# Patient Record
Sex: FEMALE | Race: WHITE
[De-identification: names, ages, dates, MRNs, and addresses within clinical notes are randomized per-mention and may not be internally consistent; named-entity substitution may affect disease eponyms.]

---

## 2019-08-19 ENCOUNTER — HOSPITAL ENCOUNTER (OUTPATIENT)
Dept: HOSPITAL 62 - SC | Age: 49
Discharge: HOME | End: 2019-08-19
Attending: OTOLARYNGOLOGY
Payer: MEDICARE

## 2019-08-19 DIAGNOSIS — I10: ICD-10-CM

## 2019-08-19 DIAGNOSIS — J34.3: ICD-10-CM

## 2019-08-19 DIAGNOSIS — J30.9: ICD-10-CM

## 2019-08-19 DIAGNOSIS — H61.21: ICD-10-CM

## 2019-08-19 DIAGNOSIS — Z98.890: ICD-10-CM

## 2019-08-19 DIAGNOSIS — H92.01: ICD-10-CM

## 2019-08-19 DIAGNOSIS — J34.89: Primary | ICD-10-CM

## 2019-08-19 DIAGNOSIS — H91.90: ICD-10-CM

## 2019-08-19 DIAGNOSIS — H72.91: ICD-10-CM

## 2019-08-19 DIAGNOSIS — E04.1: ICD-10-CM

## 2019-08-19 DIAGNOSIS — H71.91: ICD-10-CM

## 2019-08-19 DIAGNOSIS — J32.9: ICD-10-CM

## 2019-08-19 DIAGNOSIS — J34.2: ICD-10-CM

## 2019-08-19 DIAGNOSIS — T81.30XA: ICD-10-CM

## 2019-08-19 PROCEDURE — 00160 ANES PX NOSE&SINUS NOS: CPT

## 2019-08-19 PROCEDURE — 36415 COLL VENOUS BLD VENIPUNCTURE: CPT

## 2019-08-19 PROCEDURE — 87070 CULTURE OTHR SPECIMN AEROBIC: CPT

## 2019-08-19 PROCEDURE — 82785 ASSAY OF IGE: CPT

## 2019-08-19 PROCEDURE — 87077 CULTURE AEROBIC IDENTIFY: CPT

## 2019-08-19 PROCEDURE — 69205 CLEAR OUTER EAR CANAL: CPT

## 2019-08-19 PROCEDURE — 87186 SC STD MICRODIL/AGAR DIL: CPT

## 2019-08-19 PROCEDURE — 87205 SMEAR GRAM STAIN: CPT

## 2019-08-19 PROCEDURE — 31299 UNLISTED PX ACCESSORY SINUS: CPT

## 2019-08-19 PROCEDURE — 87075 CULTR BACTERIA EXCEPT BLOOD: CPT

## 2019-08-19 PROCEDURE — 86003 ALLG SPEC IGE CRUDE XTRC EA: CPT

## 2019-08-20 NOTE — [facility] OPERATIVE REPORT E
[facility] Oper[facility]



NAME: HOANG MATTHEWS

                                      MRN: [id number]

                                      AGE: 48Y

DATE OF SURGERY: 08/19/2019          ROOM:



PREOPERATIVE DIAGNOSES:

1.  Severe nasal pain.

2.  Extruding intranasal suture from a previous nasal surgery.

3.  Firm/hard intranasal debris encasing aspects of the extruded suture.

4.  Right otorrhea.

5.  Right otalgia.

6.  Right tympanic membrane perforation.

7.  History of right ear surgery.



POSTOPERATIVE DIAGNOSES:

1.  Severe nasal pain.

2.  Extruding intranasal suture from a previous nasal surgery.

3.  Firm/hard intranasal debris encasing aspects of the extruded suture.

4.  Right otorrhea.

5.  Right otalgia.

6.  Right tympanic membrane perforation.

7.  History of right ear surgery.



OPERATION PERFORMED:

1.  Exam under anesthesia of the nose with removal of nasoliths and

extruded black nylon suture from the nasal septum.

2.  Exam under anesthesia of the nose with debridement and rule out of an

intranasal abscess process.

3.  Exam under anesthesia of the right ear under microscopy under general

anesthesia with aural debridement.

4.  Right ear application of Gold Dust medicated powder under microscopy.

5.  Bilateral transnasal rigid surgical endoscopy.



SURGEON:

MAX RAMOS D.O.



ANESTHESIA:

General endotracheal tube.



ANESTHESIA STAFF:

BRENTON Huitron



ESTIMATED BLOOD LOSS:

Less than 1 mL.



FLUIDS:

500 mL.



COMPLICATIONS:

None.



DRAINS:

None.



SPONGE COUNT:

Verified.



MATERIALS FORWARDED AS SPECIMEN:

Intranasal extruded black nylon suture with a right greater than left nasal

passage nasolith.



FINDINGS:

1.  Intranasal evaluation with an extruded black nylon suture standing on

both sides of the nasal septum.  In each nasal passage there was a

nasolith/hard concretion around the extruded suture on the right side

greater than the left side.  There was no purulence noted on inspection or

upon probing of the nasal septum.  There was nasal septal deviation

involving bone and cartilage present.  There were internal and external

nasal deformities and turbinate hypertrophy noted.

2.  The right ear canal and TM was with cerumen impaction and mucoid

material especially surrounding the chronic-appearing right tympanic

membrane perforation that was approximately 25% in size.  The cerumen was

extending into the middle ear space.  There was granulation tissue

surrounding the margin of the perforation site which was noted after the

cerumen was removed.  Upon completion of the aural debridement, Gold Dust

medicated powder was applied.



INDICATIONS:

This is a 48-year-old white female patient who was seen and evaluated in

the [facility] office.  The patient had been referred for, and

she complained of severe nasal pain with complaint of an extruding

intranasal suture from a previous nasal surgery performed over 20 years

ago.  The patient has noted worsening nasal pain over the past 6 to 12

months.  The patient also complains of right ear pain, recurrent otorrhea,

and a right tympanic membrane perforation over the past 4 years.  She

reports prior history of a right tympanoplasty to repair a previous right

eardrum perforation.  [place] of [place].  The patient underwent flexible

endoscopy which had to be stopped due to patient discomfort.  Findings were

as noted above.  Discussion was also held to perform right ear aural

debridement under microscopy in the clinic setting, but the patient

declined due to concern for pain.  After extensive discussion with the

patient, recommendations and plan was made to proceed with exam under

anesthesia in the operating room setting of the nose and the ear. 

Evaluation of the nose was to include nasal debridement, suture removal,

and rule out of an intranasal abscess process, and cultures were to be

taken.  The ear exam under anesthesia was to include aural debridement and

placement of medication as indicated.  The procedures and all of their

risks and complications were all discussed in detail with the patient.  She

voiced an understanding of the described surgical plan, agreed to proceed,

and consent was obtained.



PROCEDURE:

The patient was taken to the operating room and placed on the operating

room table in the supine position.  Appropriate monitors were placed. 

Using mask and IV access, general anesthesia was induced.  The patient was

transorally intubated without difficulty.  The patient next underwent a

nasal examination with injection of local anesthetic with epinephrine.  The

patient underwent bilateral transnasal rigid surgical endoscopy with

findings as noted above.  The suture was cut on the left side and was then

pulled all the way through with suture and concretions/nasolith being sent

for pathology evaluation.  Prior to any debridement, there were first

bilateral intranasal cultures that were obtained.  The nasal septum was

probed and there were no areas of fluctuance or purulence released.  The

areas that appeared as fullness were actually deviations of septal

cartilage and/or bone.  Once the process was complete, there was Bacitracin

ointment placed into each nasal passage.



At this point attention was turned to the patient's right ear which was

examined under microscopy with use of an ear speculum.  Cerumen was gently

elevated from the EAC and tympanic membrane surface as well as the portion

extending into the middle ear space, and it was all removed intact.  There

was no obvious cerumen/debris remaining in the middle ear space.  There was

mucoid material that was also suctioned.  Granulation tissue that was

present around the perforation site margin was with scant bleeding.  Once

the aural debridement was complete, there was a very limited dusting of

Gold Dust medicated powder placed into the right ear.  At this point the

operating room microscope was withdrawn.  The patient was then returned to

the anesthesia staff and was allowed to emerge from general anesthesia. 

The patient was extubated in the main operating room and was then

transported to the postanesthesia recovery unit in stable condition.  There

were no complications.



DICTATING PHYSICIAN: MAX RAMOS D.O.



[id number]              DT: 08/20/2019 2037

PHY#: [id number]         DD: 08/20/2019 1916

ID:   [id number]               JOB#: [id number]       ACCT: [id number]



cc:MAX RAMOS D.O.

>

## 2019-09-03 ENCOUNTER — HOSPITAL ENCOUNTER (OUTPATIENT)
Dept: HOSPITAL 62 - RAD | Age: 49
End: 2019-09-03
Attending: OTOLARYNGOLOGY
Payer: MEDICARE

## 2019-09-03 DIAGNOSIS — J32.9: Primary | ICD-10-CM

## 2019-09-03 DIAGNOSIS — E04.1: ICD-10-CM

## 2019-09-03 PROCEDURE — 76536 US EXAM OF HEAD AND NECK: CPT

## 2019-09-03 PROCEDURE — 70486 CT MAXILLOFACIAL W/O DYE: CPT

## 2019-09-03 NOTE — RADIOLOGY REPORT (SQ)
EXAM DESCRIPTION:  U/S THYROID/SFT TISS HD   NECK



COMPLETED DATE/TIME:  9/3/2019 4:02 pm



REASON FOR STUDY:  E04.1 NONTOXIC SINGLE THYROID NODULE J32.9  CHRONIC SINUSITIS, UNSPECIFIED E04.1  
NONTOXIC SINGLE THYROID NODULE



COMPARISON:  None.



TECHNIQUE:  Dynamic and static gray-scale images acquired of the thyroid gland. Selected additional c
olor/power Doppler images recorded.  All images stored to PACS.



LIMITATIONS:  None.



FINDINGS:  RIGHT LOBE: Normal size, 4.2 x 1.7 x 1.1 cm in size.  Homogeneous echotexture.  No solid m
asses.  4 mm well-circumscribed spongiform nodule, wider than tall, smooth margins, no echogenic foci
 (TI-RADS 1)

LEFT LOBE: Normal size, 4 x 1.6 x 1.3 cm in size.  Homogeneous echotexture.  No cystic or solid norma
s.

ISTHMUS: Normal thickness, 2 mm.  Homogeneous echotexture.  No cystic or solid masses.

OTHER: No other significant finding.



IMPRESSION:  ESSENTIALLY NORMAL THYROID ULTRASOUND.



COMMENT:   The American College of Radiology (ACR) Thyroid Imaging Reporting And Data System (TI-RADS
) is an ultrasound feature based summed scoring system of risk categorization and management recommen
dations for thyroid nodules.

TI-RADS assessment categories are as follows:

0 - Incomplete exam: Additional imaging or comparison to prior examinations recommended.

1. - Benign: Fine-needle aspiration or follow-up not routinely recommended in the absence of clinical
 change.

2. - Not suspicious: Fine-needle aspiration or follow-up not routinely recommended in the absence of 
clinical change.

3. - Mildly suspicious: Fine-needle aspiration recommended if greater than or equal to 2.5 cm in size
. Ultrasound follow-up recommended if greater than or equal to 1.5 cm in size.

4. - Moderately suspicious: Fine-needle aspiration recommended if greater than or equal to 1.5 cm in 
size. Ultrasound follow-up recommended if greater than or equal to 1.0 cm in size.

5. - Highly suspicious: Fine-needle aspiration recommended if greater than or equal to 1.0 cm in size
. Ultrasound follow-up recommended if greater than or equal to 0.5 cm in size.



TECHNICAL DOCUMENTATION:  JOB ID:  [id number]

 2011 [employer]- All Rights Reserved



Reading location - IP/workstation name: [email or web address]

## 2019-09-03 NOTE — RADIOLOGY REPORT (SQ)
EXAM DESCRIPTION:  CT SINUSES FOR ENT



COMPLETED DATE/TIME:  9/3/2019 4:32 pm



REASON FOR STUDY:  J32.9 CHRONIC SINUSITIS, UNSPECIFIED J32.9  CHRONIC SINUSITIS, UNSPECIFIED E04.1  
NONTOXIC SINGLE THYROID NODULE



COMPARISON:  None.



TECHNIQUE:  Noncontrast scanning through the paranasal sinuses using bone algorithm.   Reconstructed 
MPR images reviewed.  All images stored on PACS.

All CT scanners at this facility use dose modulation, iterative reconstruction, and/or weight based d
osing when appropriate to reduce radiation dose to as low as reasonably achievable (ALARA).

[facility]: Dose Right  [facility]: CareDose    [facility]: Dose Right    [facility]: Teradose 4D    [facility]: Smart Technologies



RADIATION DOSE:  46mGy.



LIMITATIONS:  None.



FINDINGS:  Right sinuses and drainage pathways:

Post-surgical changes: Right sided endoscopic sinus surgery with widening of the ostiomeatal unit, re
section of anterior ethmoid septa.  There is a radiopaque foreign body in the inferior right frontal 
sinus and right frontoethmoidal recess on sagittal image 136 and coronal image 94.

Frontal sinus:  Mucous membrane thickening along the floor of the right frontal sinus and fronto ethm
oid recess.

Frontoethmoidal Recess:  Opacified with mucous membrane thickening.

Anterior Ethmoid Sinuses:  Post resection of the ethmoid septa with persistent mucous membrane thicke
derrek along the lateral wall of the anterior ethmoids.

Posterior Ethmoid Sinuses:  Completely opacified

Sphenoid Sinus: Mucous membrane thickening anterior wall

Sphenoethmoidal Recess:  Completely opacified

Maxillary Sinus:  Circumferential mucous membrane thickening

Ostiomeatal Complex:  Surgical widening on coronal images , with persistent mucous membrane thi
ckening occluding outlet.

Left Sinuses and Drainage Pathways:

Post-Surgical Changes:  Surgical widening of the left maxillary sinus outlet and resection of anterio
r left ethmoid septa

Frontal Sinus:  Normal.

Frontoethmoidal Recess:  Mucous membrane thickening along the fronto ethmoid recess

Anterior Ethmoid Sinuses:  Persistent mucous membrane thickening along the medial aspect of the commo
n anterior ethmoid cavity

Posterior Ethmoid Sinuses:  Normal.

Sphenoid Sinus:  Normal.

Sphenoethmoidal Recess:  Patent on axial image 76

Maxillary Sinus:  Normal.

Ostiomeatal Complex:  Surgical widening of the left ostiomeatal complex.  No significant mucous membr
ane thickening

Right Olfactory Fossa:  Normal.

Left Olfactory Fossa:  Normal.

Middle Turbinate Jhoana Bullosa: No

Paradoxical Middle Turbinate:  No

Atelectatic Uncinated Process:  No

Frontal Fausto Cell Type I:  Bilateral.

Frontal Fausto Cell Type II:  On the right on sagittal image 138 and coronal image 77

Interfrontal Sinus Septal Cell:  None.

Suptra-Orbital Ethmoid:  Bilateral.

Frontal Bullar Cell:  None.

Suprabullar Bullar Cell:  None.

Sphenoethmoidal (Onodi) Cell:  None.

Pneumatization of the Anterior Clinoid Processes:  None.

Hypoplastic Maxillary Sinus:  None.

Osteoneogenesis:  None.

Bone Dehiscence: None.

Nasal Cavity:  Normal.

Nasal Septum:  Normal.

Anatomic Variants:

Right Vidian Canal:  Normal

Left Vidian Canal:  Normal



IMPRESSION:  Chronic appearing inflammatory changes as above.  Postsurgical changes as above



TECHNICAL DOCUMENTATION:  JOB ID:  [id number]

Quality ID # 436: Final reports with documentation of one or more dose reduction techniques (e.g., Au
tomated exposure control, adjustment of the mA and/or kV according to patient size, use of iterative 
reconstruction technique)

 2011 [employer]- All Rights Reserved



Reading location - IP/workstation name: [email or web address]

## 2019-11-18 ENCOUNTER — HOSPITAL ENCOUNTER (OUTPATIENT)
Dept: HOSPITAL 62 - LAB | Age: 49
End: 2019-11-18
Attending: INTERNAL MEDICINE
Payer: MEDICARE

## 2019-11-18 ENCOUNTER — HOSPITAL ENCOUNTER (OUTPATIENT)
Dept: HOSPITAL 62 - WI | Age: 49
End: 2019-11-18
Attending: INTERNAL MEDICINE
Payer: MEDICARE

## 2019-11-18 DIAGNOSIS — R10.13: Primary | ICD-10-CM

## 2019-11-18 LAB
ALBUMIN SERPL-MCNC: 4.4 G/DL (ref 3.5–5)
ALP SERPL-CCNC: 138 U/L (ref 38–126)
AST SERPL-CCNC: 26 U/L (ref 14–36)
BILIRUB DIRECT SERPL-MCNC: 0.1 MG/DL (ref 0–0.4)
BILIRUB SERPL-MCNC: 0.6 MG/DL (ref 0.2–1.3)
PROT SERPL-MCNC: 7.5 G/DL (ref 6.3–8.2)

## 2019-11-18 PROCEDURE — 76705 ECHO EXAM OF ABDOMEN: CPT

## 2019-11-18 PROCEDURE — 36415 COLL VENOUS BLD VENIPUNCTURE: CPT

## 2019-11-18 PROCEDURE — 83690 ASSAY OF LIPASE: CPT

## 2019-11-18 PROCEDURE — 80076 HEPATIC FUNCTION PANEL: CPT

## 2019-11-18 NOTE — [facility]OMENS IMAGING REPORT
EXAM DESCRIPTION:  U/S ABDOMEN LIMITED



COMPLETED DATE/TIME:  11/18/2019 9:15 am



REASON FOR STUDY:  R10.13 EPIGASTRIC PAIN R10.13  EPIGASTRIC PAIN



COMPARISON:  None.



TECHNIQUE:  Dynamic and static grayscale images acquired of the abdomen and recorded on PACS. Additio
nal selected color Doppler and spectral images recorded.



LIMITATIONS:  None.



FINDINGS:  PANCREAS:  The head of the pancreas is of normal echogenicity.  The body and tail are obsc
ured by overlying bowel gas.

LIVER:  Mildly fatty liver.  The liver measures 15.0 cm in length, normal size.

LIVER VASCULATURE: Normal directional flow of the main portal vein and hepatic veins.

GALLBLADDER:  Prior cholecystectomy.

ULTRASOUND-DETECTED LAU'S SIGN: Negative.

INTRAHEPATIC DUCTS AND COMMON DUCT: CBD measures 4.8 mm in diameter, normal.  The  intrahepatic ducts
 normal caliber. No filling defects.

INFERIOR VENA CAVA:  Suboptimal visualization due to overlying bowel gas.

AORTA:  The proximal segment is patent.  The mid and distal segments are obscured by overlying bowel 
gas.

RIGHT KIDNEY:  The right kidney measures 11.0 x 4.3 x 4.8 cm, normal size. Normal echogenicity. No so
lid or suspicious masses. No hydronephrosis. No calcifications.

PERITONEAL AND RIGHT PLEURAL SPACE: No ascites or effusions.

OTHER: No other significant findings.



IMPRESSION:  1.  Prior cholecystectomy.

2.  Suboptimal visualization of the abdominal aorta, inferior vena cava and body and tail of the panc
reas due to overlying bowel gas.

3.  Mild fatty liver.



TECHNICAL DOCUMENTATION:  JOB ID:  [id number]

 2011 [employer]- All Rights Reserved



Reading location - IP/workstation name: [email or web address]

## 2020-01-16 ENCOUNTER — HOSPITAL ENCOUNTER (OUTPATIENT)
Dept: HOSPITAL 62 - RAD | Age: 50
End: 2020-01-16
Attending: INTERNAL MEDICINE
Payer: MEDICARE

## 2020-01-16 DIAGNOSIS — R10.12: ICD-10-CM

## 2020-01-16 DIAGNOSIS — K45.8: Primary | ICD-10-CM

## 2020-01-16 PROCEDURE — 74177 CT ABD & PELVIS W/CONTRAST: CPT

## 2020-01-16 PROCEDURE — 82565 ASSAY OF CREATININE: CPT

## 2020-01-16 NOTE — RADIOLOGY REPORT (SQ)
EXAM DESCRIPTION:  CT ABD/PELVIS WITH IV   ORAL



COMPLETED DATE/TIME:  1/16/2020 10:24 am



REASON FOR STUDY:  LEFT UPPER QUADRANT PAIN,OTH ABDOMINAL HERNIA WITH R10.12  LEFT UPPER QUADRANT BRANDON
N K45.8  OTH ABDOMINAL HERNIA WITHOUT OBSTRUCTION OR GANGRENE



COMPARISON:  None.



TECHNIQUE:  CT scan of the abdomen and pelvis performed with intravenous and oral contrast using irish
lindsey scanning technique with dynamic intravenous contrast injection. Images reviewed with lung, soft t
issue, and bone windows. Reconstructed coronal and sagittal MPR images reviewed. Delayed images for e
valuation of the urinary system also acquired. All images stored on PACS.

All CT scanners at this facility use dose modulation, iterative reconstruction, and/or weight based d
osing when appropriate to reduce radiation dose to as low as reasonably achievable (ALARA).

[facility]: Dose Right  [facility]: CareDose    [facility]: Dose Right    [facility]: Teradose 4D    [facility]: [employer]



CONTRAST TYPE AND DOSE:  contrast/concentration: Isovue 350.00 mg/ml; Total Contrast Delivered: 78.0 
ml; Total Saline Delivered: 67.0 ml



RENAL FUNCTION:  None required. The patient is less than 50 years old.



RADIATION DOSE:  CT Rad equipment meets quality standard of care and radiation dose reduction techniq
ues were employed. CTDIvol: 7.3 - 7.4 mGy. DLP: 735 mGy-cm. .



LIMITATIONS:  Artifact from lower lumbar fusion.



FINDINGS:  LOWER CHEST: Small hiatal hernia.

LIVER: Normal size. No masses.  No dilated ducts.

SPLEEN: Normal size. No focal lesions.

PANCREAS: No masses. No significant calcifications. No adjacent inflammation or peripancreatic fluid 
collections. Pancreatic duct not dilated.

GALLBLADDER: Surgically absent.

ADRENAL GLANDS: No significant masses or asymmetry.

RIGHT KIDNEY AND URETER: No solid masses.   No significant calcifications.   No hydronephrosis or hyd
roureter.

LEFT KIDNEY AND URETER: No solid masses.   No significant calcifications.   No hydronephrosis or hydr
oureter.

AORTA AND VESSELS: No aneurysm. No dissection. Renal arteries, SMA, celiac without stenosis.

RETROPERITONEUM: No retroperitoneal adenopathy, hemorrhage or masses.

BOWEL AND PERITONEAL CAVITY: No obstruction. No visualized masses. No free fluid.  No inflammatory ch
anges or thickening of bowel wall.

APPENDIX: Not visualized.

PELVIS: No significant masses.  Normal bladder. No free fluid.

ABDOMINAL WALL: No masses. No hernias.

BONES: Scoliosis.  Prior lower lumbar posterior decompression and fusion.

OTHER: No other significant finding.



IMPRESSION:  No acute findings in the abdomen or pelvis.



TECHNICAL DOCUMENTATION:  JOB ID:  [id number]

Quality ID # 436: Final reports with documentation of one or more dose reduction techniques (e.g., Au
tomated exposure control, adjustment of the mA and/or kV according to patient size, use of iterative 
reconstruction technique)

 2011 [employer]- All Rights Reserved



Reading location - IP/workstation name: [email or web address]

## 2020-11-04 ENCOUNTER — HOSPITAL ENCOUNTER (OUTPATIENT)
Dept: HOSPITAL 62 - SC | Age: 50
End: 2020-11-04
Attending: PODIATRIST
Payer: MEDICARE

## 2020-11-04 DIAGNOSIS — F17.210: ICD-10-CM

## 2020-11-04 DIAGNOSIS — M25.572: Primary | ICD-10-CM

## 2020-11-04 DIAGNOSIS — Z03.818: ICD-10-CM

## 2020-11-04 PROCEDURE — 87635 SARS-COV-2 COVID-19 AMP PRB: CPT

## 2020-11-04 PROCEDURE — C9290 INJ, BUPIVACAINE LIPOSOME: HCPCS

## 2020-11-04 PROCEDURE — 28288 PARTIAL REMOVAL OF FOOT BONE: CPT

## 2020-11-04 PROCEDURE — 20680 REMOVAL OF IMPLANT DEEP: CPT

## 2020-11-04 PROCEDURE — C9803 HOPD COVID-19 SPEC COLLECT: HCPCS

## 2020-11-04 PROCEDURE — 73660 X-RAY EXAM OF TOE(S): CPT

## 2020-11-04 RX ADMIN — DEXAMETHASONE SODIUM PHOSPHATE ONE MG: 4 INJECTION, SOLUTION INTRAMUSCULAR; INTRAVENOUS at 09:25

## 2020-11-04 RX ADMIN — DEXAMETHASONE SODIUM PHOSPHATE ONE MG: 4 INJECTION, SOLUTION INTRAMUSCULAR; INTRAVENOUS at 00:00

## 2020-11-04 NOTE — RADIOLOGY REPORT (SQ)
EXAM DESCRIPTION:  TOE LEFT; NO CHG FLUORO



IMAGES COMPLETED DATE/TIME:  11/4/2020 11:19 am



REASON FOR STUDY:  SCREW REMOVAL AND BONE REMODEL IST LEFT TOE ASSISTED WITH FLUORO IN OR M79.672  PA
IN IN LEFT FOOT



COMPARISON:  None.



FLUOROSCOPY TIME:  9 seconds.

2 images saved to PACS.



TECHNIQUE:  Intra-operative images acquired during surgical procedure to evaluate progress.

NUMBER OF IMAGES: 2 images.



LIMITATIONS:  None.



FINDINGS:  Images of the toe acquired during the surgical procedure.



IMPRESSION:  IMAGE(S) OBTAINED DURING PROCEDURE.



COMMENT:  Quality :  Final reports for procedures using fluoroscopy that document radiation exp
osure indices, or exposure time and number of fluorographic images (if radiation exposure indices are
 not available)

Please consult full operative report of the attending physician for description of the procedure.



TECHNICAL DOCUMENTATION:  JOB ID:  [id number]

 2011 [employer]- All Rights Reserved



Reading location - IP/workstation name: [email or web address]

## 2020-11-04 NOTE — RADIOLOGY REPORT (SQ)
EXAM DESCRIPTION:  TOE LEFT; NO CHG FLUORO



IMAGES COMPLETED DATE/TIME:  11/4/2020 11:19 am



REASON FOR STUDY:  SCREW REMOVAL AND BONE REMODEL IST LEFT TOE ASSISTED WITH FLUORO IN OR M79.672  PA
IN IN LEFT FOOT



COMPARISON:  None.



FLUOROSCOPY TIME:  9 seconds.

2 images saved to PACS.



TECHNIQUE:  Intra-operative images acquired during surgical procedure to evaluate progress.

NUMBER OF IMAGES: 2 images.



LIMITATIONS:  None.



FINDINGS:  Images of the toe acquired during the surgical procedure.



IMPRESSION:  IMAGE(S) OBTAINED DURING PROCEDURE.



COMMENT:  Quality :  Final reports for procedures using fluoroscopy that document radiation exp
osure indices, or exposure time and number of fluorographic images (if radiation exposure indices are
 not available)

Please consult full operative report of the attending physician for description of the procedure.



TECHNICAL DOCUMENTATION:  JOB ID:  [id number]

 2011 [employer]- All Rights Reserved



Reading location - IP/workstation name: [email or web address]

## 2020-11-04 NOTE — OPERATIVE REPORT
Operative Report


DATE OF SURGERY: 11/04/20


PREOPERATIVE DIAGNOSIS: Painful first metatarsophalangeal joint left foot.


POSTOPERATIVE DIAGNOSIS: Same.


OPERATION: Removal of deep hardware.  Cheilectomy first metatarsophalangeal 

joint.  All left foot.


SURGEON: LESTER CABRERA ASSISTANT: VIOLA SNYDER


ANESTHESIA: LMAC


COMPLICATIONS: 


None.





ESTIMATED BLOOD LOSS: Us then 1.0 mL.


PROCEDURE: 


On 11/4/2020 patient was ministered to care with complaint of a painful left 

foot patient was taken the operating will follow procedure form.  Removal of 

deep buried hardware and cheilectomy of first metatarsophalangeal joint left 

foot.  Following intravenous sedation and regional local anesthesia on the left 

foot was then prepped and draped in usual sterile manner.  Tourniquet is placed 

proximal to the ankle malleoli.  Esmarch was applied to left foot tourniquet was

inflated to level of 250 mmHg, sterile drape was completed, and the following 

procedure form.  Attention was directed to the medial aspect of the first 

metatarsophalangeal joint left foot, where there is a previous scar from a prior

bunionectomy.  Incision made into the scar following along its course on the 

medial aspect and approximately 5 cm in length.  Was deepened through the 

subcutaneous tissue and superficial fascia until the medial capsule of first 

metatarsophalangeal joint was exposed.  Incision was made in the capsular and 

periosteal structures in a similar fashion as original skin incision.  The 

capsule was retracted dorsally and plantarly for preservation.  The prominent 

and protruding screw was identified in the medial plantar base of the proximal 

phalanx and was removed in toto.  The dorsal aspect the first metatarsal head 

was osteotomized removing a prominent portion of bone, and likewise the medial 

aspect first metatarsal shaft was also osteotomized removing hypertrophied bone.

 Utilizing power rasp all sharp osseous edges were smooth.  The articular 

surfaces were examined thoroughly there was noted to be some thinning of the 

articular cartilage on the first metatarsal head.  It is felt that the procedure

was completed and the area was then flushed with copious amounts of sterile 

antibiotic solution and inspected for soft tissue or osseous debris with none 

being noted.  Bone wax was applied to all freshly cut bony surfaces.  The 

capsular periosteal structures were coapted and maintained with simple suture of

3-0 Vicryl.  At that time was felt that there is some contracture of the long 

extensor tendon and it was displaced and the more lateral position.  Utilizing 

blunt dissection the long extensor tendon was freed from its surrounding soft 

tissues attachments.  Utilizing a 15 blade, six partial thickness cuts were 

placed in the long extensor tendon over the course of approximately 3 cm to help

relieve some of the tension on the long extensor tendon.  The tendon sheath was 

then sutured to the medial capsule, realigning the tendon in a more midline 

position over the metatarsal.  The forefoot was loaded and the hallux was noted 

to be in excellent position.  Throughout the procedure fluoroscopic studies were

obtained, final study revealed adequate removal of of bone and alignment of the 

first metatarsophalangeal joint.  At that time the subcutaneous tissue was 

coapted and maintained with simple suture of 4-0 Vicryl.  Exparel was then 

injected subcutaneously in the periwound area.  The skin incision coapted 

maintained with interrupted horizontal mattress suture of 5-0 nylon.  Sterile 

dressings of Dale silk, 4 x 4's, Rebel, Kerlix and Coflex was applied to the 

patient's left foot, tourniquet is rapidly deflated, capillary filling time was 

instantaneous to all digits.  Patient appeared to tolerate surgery anesthesia 

well was taken recovery further monitored by anesthesia department.